# Patient Record
Sex: FEMALE | Race: WHITE | Employment: FULL TIME | URBAN - METROPOLITAN AREA
[De-identification: names, ages, dates, MRNs, and addresses within clinical notes are randomized per-mention and may not be internally consistent; named-entity substitution may affect disease eponyms.]

---

## 2017-03-06 ENCOUNTER — OFFICE VISIT (OUTPATIENT)
Dept: ORTHOPEDIC SURGERY | Age: 47
End: 2017-03-06

## 2017-03-06 VITALS — HEIGHT: 67 IN | WEIGHT: 150 LBS | BODY MASS INDEX: 23.54 KG/M2

## 2017-03-06 DIAGNOSIS — M25.561 ACUTE PAIN OF RIGHT KNEE: Primary | ICD-10-CM

## 2017-03-06 PROCEDURE — 99213 OFFICE O/P EST LOW 20 MIN: CPT | Performed by: ORTHOPAEDIC SURGERY

## 2017-03-06 PROCEDURE — 73562 X-RAY EXAM OF KNEE 3: CPT | Performed by: ORTHOPAEDIC SURGERY

## 2017-03-10 ENCOUNTER — OFFICE VISIT (OUTPATIENT)
Dept: ORTHOPEDIC SURGERY | Age: 47
End: 2017-03-10

## 2017-03-10 VITALS
DIASTOLIC BLOOD PRESSURE: 80 MMHG | BODY MASS INDEX: 23.53 KG/M2 | HEIGHT: 67 IN | HEART RATE: 77 BPM | SYSTOLIC BLOOD PRESSURE: 115 MMHG | WEIGHT: 149.91 LBS

## 2017-03-10 DIAGNOSIS — S83.281A ACUTE LATERAL MENISCAL TEAR, RIGHT, INITIAL ENCOUNTER: ICD-10-CM

## 2017-03-10 DIAGNOSIS — M23.006 MENISCAL CYST, RIGHT: ICD-10-CM

## 2017-03-10 DIAGNOSIS — M17.11 PRIMARY OSTEOARTHRITIS OF RIGHT KNEE: Primary | ICD-10-CM

## 2017-03-10 PROBLEM — M23.009 MENISCAL CYST: Status: ACTIVE | Noted: 2017-03-10

## 2017-03-10 PROBLEM — S83.289A ACUTE LATERAL MENISCAL TEAR: Status: ACTIVE | Noted: 2017-03-10

## 2017-03-10 PROCEDURE — 99213 OFFICE O/P EST LOW 20 MIN: CPT | Performed by: ORTHOPAEDIC SURGERY

## 2018-08-08 ENCOUNTER — HOSPITAL ENCOUNTER (OUTPATIENT)
Dept: WOMENS IMAGING | Age: 48
Discharge: HOME | End: 2018-08-08
Payer: COMMERCIAL

## 2018-08-08 DIAGNOSIS — Z12.31 ENCOUNTER FOR SCREENING MAMMOGRAM FOR BREAST CANCER: ICD-10-CM

## 2018-08-08 PROCEDURE — 77067 SCR MAMMO BI INCL CAD: CPT

## 2022-05-22 ENCOUNTER — HOSPITAL ENCOUNTER (EMERGENCY)
Age: 52
Discharge: HOME | End: 2022-05-22
Attending: STUDENT IN AN ORGANIZED HEALTH CARE EDUCATION/TRAINING PROGRAM
Payer: COMMERCIAL

## 2022-05-22 ENCOUNTER — APPOINTMENT (OUTPATIENT)
Dept: CT IMAGING | Age: 52
End: 2022-05-22
Payer: COMMERCIAL

## 2022-05-22 ENCOUNTER — APPOINTMENT (OUTPATIENT)
Dept: GENERAL RADIOLOGY | Age: 52
End: 2022-05-22
Payer: COMMERCIAL

## 2022-05-22 VITALS
BODY MASS INDEX: 25.9 KG/M2 | SYSTOLIC BLOOD PRESSURE: 119 MMHG | TEMPERATURE: 99.1 F | HEIGHT: 67 IN | OXYGEN SATURATION: 95 % | RESPIRATION RATE: 16 BRPM | WEIGHT: 165 LBS | DIASTOLIC BLOOD PRESSURE: 72 MMHG | HEART RATE: 86 BPM
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
## 2022-05-22 DIAGNOSIS — S01.01XA LACERATION OF SCALP, INITIAL ENCOUNTER: Primary | ICD-10-CM

## 2022-05-22 DIAGNOSIS — F10.920 ACUTE ALCOHOLIC INTOXICATION WITHOUT COMPLICATION (HCC): ICD-10-CM

## 2022-05-22 DIAGNOSIS — S62.306A CLOSED NONDISPLACED FRACTURE OF FIFTH METACARPAL BONE OF RIGHT HAND, UNSPECIFIED PORTION OF METACARPAL, INITIAL ENCOUNTER: ICD-10-CM

## 2022-05-22 PROCEDURE — 12002 RPR S/N/AX/GEN/TRNK2.6-7.5CM: CPT

## 2022-05-22 PROCEDURE — 6370000000 HC RX 637 (ALT 250 FOR IP): Performed by: STUDENT IN AN ORGANIZED HEALTH CARE EDUCATION/TRAINING PROGRAM

## 2022-05-22 PROCEDURE — 90471 IMMUNIZATION ADMIN: CPT | Performed by: STUDENT IN AN ORGANIZED HEALTH CARE EDUCATION/TRAINING PROGRAM

## 2022-05-22 PROCEDURE — 90715 TDAP VACCINE 7 YRS/> IM: CPT | Performed by: STUDENT IN AN ORGANIZED HEALTH CARE EDUCATION/TRAINING PROGRAM

## 2022-05-22 PROCEDURE — 6360000002 HC RX W HCPCS: Performed by: STUDENT IN AN ORGANIZED HEALTH CARE EDUCATION/TRAINING PROGRAM

## 2022-05-22 PROCEDURE — 70450 CT HEAD/BRAIN W/O DYE: CPT

## 2022-05-22 PROCEDURE — 99284 EMERGENCY DEPT VISIT MOD MDM: CPT

## 2022-05-22 PROCEDURE — 73130 X-RAY EXAM OF HAND: CPT

## 2022-05-22 PROCEDURE — 2500000003 HC RX 250 WO HCPCS: Performed by: STUDENT IN AN ORGANIZED HEALTH CARE EDUCATION/TRAINING PROGRAM

## 2022-05-22 RX ORDER — LIDOCAINE HYDROCHLORIDE AND EPINEPHRINE 10; 10 MG/ML; UG/ML
20 INJECTION, SOLUTION INFILTRATION; PERINEURAL ONCE
Status: COMPLETED | OUTPATIENT
Start: 2022-05-22 | End: 2022-05-22

## 2022-05-22 RX ORDER — ACETAMINOPHEN 500 MG
1000 TABLET ORAL ONCE
Status: COMPLETED | OUTPATIENT
Start: 2022-05-22 | End: 2022-05-22

## 2022-05-22 RX ADMIN — ACETAMINOPHEN 1000 MG: 500 TABLET ORAL at 01:33

## 2022-05-22 RX ADMIN — LIDOCAINE HYDROCHLORIDE,EPINEPHRINE BITARTRATE 20 ML: 10; .01 INJECTION, SOLUTION INFILTRATION; PERINEURAL at 02:07

## 2022-05-22 RX ADMIN — TETANUS TOXOID, REDUCED DIPHTHERIA TOXOID AND ACELLULAR PERTUSSIS VACCINE, ADSORBED 0.5 ML: 5; 2.5; 8; 8; 2.5 SUSPENSION INTRAMUSCULAR at 01:33

## 2022-05-22 ASSESSMENT — PAIN SCALES - GENERAL
PAINLEVEL_OUTOF10: 4
PAINLEVEL_OUTOF10: 4

## 2022-05-22 ASSESSMENT — PAIN - FUNCTIONAL ASSESSMENT: PAIN_FUNCTIONAL_ASSESSMENT: 0-10

## 2022-05-22 NOTE — ED NOTES
[facility] for d/c. Stable, ambulatory, w/ .       Kathy Rodriguez, VALENTE  05/22/22 1658

## 2022-05-22 NOTE — ED PROVIDER NOTES
[facility]  ED  EMERGENCY DEPARTMENT ENCOUNTER      Pt Name: Julita Rodriguez  MRN: [id number]  [place] 1970  Date of evaluation: 5/22/2022  Provider: Val Dumont MD    [address] [place] [address]       Chief Complaint   Patient presents with    Laceration     fell in kitchen hit metal handle, pt admits to etoh      Fall, laceration, R hand pain    HISTORY OF PRESENT ILLNESS   (Location/Symptom, Timing/Onset,Context/Setting, Quality, Duration, Modifying Factors, Severity)  Note limiting factors. Julita Rodriguez is a 46 y.o. female who presents to the ED with a chief complaint of laceration to the R frontotemporal scalp s/p fall that occurred immediately prior to arrival.  Pt slipped in the kitchen, fall described as mechanical, she had been drinking alcohol prior to the fall. Denies LOC, nausea, vomiting, focal weakness, neck pain. Mild headache. Also c/o R hand pain localized in the distribution of the 5th metacarpal.  Symptoms not otherwise alleviated or exacerbated by other factors. NursingNotes were reviewed. REVIEW OF SYSTEMS    (2-9 systems for level 4, 10 or more for level 5)     Review of Systems   Constitutional: Negative for chills and fever. HENT: Negative for congestion and sore throat. Eyes: Negative for pain and visual disturbance. Respiratory: Negative for cough and shortness of breath. Cardiovascular: Negative for chest pain and palpitations. Gastrointestinal: Negative for abdominal pain, diarrhea, nausea and vomiting. Genitourinary: Negative for dysuria and frequency. Musculoskeletal: Negative for back pain and neck pain. R hand pain   Skin: Positive for wound. Negative for rash. Neurological: Positive for headaches. Negative for dizziness, weakness and light-headedness. PAST MEDICAL HISTORY     Past Medical History:   Diagnosis Date    Primary osteoarthritis of right knee 3/10/2017         SURGICALHISTORY     Denies pertinent.       CURRENT MEDICATIONS       Discharge Medication List as of 5/22/2022  2:59 AM      CONTINUE these medications which have NOT CHANGED    Details   butalbital-acetaminophen-caffeine (FIORICET, ESGIC) per tablet Historical Med      prednisoLONE acetate (PRED FORTE) 1 % ophthalmic suspension Historical Med      naproxen sodium (ANAPROX DS) 550 MG tablet Take 1 tablet by mouth 2 times daily (with meals). , Disp-60 tablet, R-1             ALLERGIES     Mefloquine    FAMILY HISTORY     Denies pertinent. SOCIAL HISTORY       Social History     Socioeconomic History    Marital status:      Spouse name: Not on file    Number of children: Not on file    Years of education: Not on file    Highest education level: Not on file   Occupational History    Not on file   Tobacco Use    Smoking status: Never Smoker    Smokeless tobacco: Never Used   Substance and Sexual Activity    Alcohol use: Yes     Comment: social     Drug use: No    Sexual activity: Yes     Partners: Male   Other Topics Concern    Not on file   Social History Narrative    Not on file     Social Determinants of Health     Financial Resource Strain:     Difficulty of Paying Living Expenses: Not on file   Food Insecurity:     Worried About Running Out of Food in the Last Year: Not on file    Gary of Food in the Last Year: Not on file   Transportation Needs:     Lack of Transportation (Medical): Not on file    Lack of Transportation (Non-Medical):  Not on file   Physical Activity:     Days of Exercise per Week: Not on file    Minutes of Exercise per Session: Not on file   Stress:     Feeling of Stress : Not on file   Social Connections:     Frequency of Communication with Friends and Family: Not on file    Frequency of Social Gatherings with Friends and Family: Not on file    Attends Jehovah's witness Services: Not on file    Active Member of Clubs or Organizations: Not on file    Attends Club or Organization Meetings: Not on file    Marital Status: Not on file   Intimate Partner Violence:     Fear of Current or Ex-Partner: Not on file    Emotionally Abused: Not on file    Physically Abused: Not on file    Sexually Abused: Not on file   Housing Stability:     Unable to Pay for Housing in the Last Year: Not on file    Number of [place] in the Last Year: Not on file    Unstable Housing in the Last Year: Not on file       SCREENINGS    [place] Coma Scale  Eye Opening: Spontaneous  Best Verbal Response: Oriented  Best Motor Response: Obeys commands  [place] Coma Scale Score: 15        PHYSICAL EXAM    (up to 7 for level 4, 8 or more for level 5)     ED Triage Vitals   BP Temp Temp Source Pulse Resp SpO2 Height Weight   05/22/22 0050 05/22/22 0048 05/22/22 0048 05/22/22 0048 05/22/22 0048 05/22/22 0048 05/22/22 0048 05/22/22 0048   (!) 146/86 99.1 °F (37.3 °C) Oral 86 16 99 % 5' 7\" (1.702 m) 165 lb (74.8 kg)       General: Alert and oriented appropriately for age, No acute distress. Eye: Normal conjunctiva. Sclera anicteric. Physical Exam  HENT:      Head: Normocephalic. Laceration (R frontotemporal scalp, approx 5 cm) present. No raccoon eyes. Jaw: There is normal jaw occlusion. No trismus, tenderness, pain on movement or malocclusion. Mouth/Throat:      Mouth: No lacerations. Dentition: No dental tenderness. Pharynx: Oropharynx is clear. Neck:      Trachea: Trachea and phonation normal.   Musculoskeletal:      Right hand: Swelling and bony tenderness (R 5th distal metacarpal) present. No deformity. Normal range of motion. There is no disruption of two-point discrimination. Normal capillary refill. Normal pulse. Cervical back: Full passive range of motion without pain. No pain with movement, spinous process tenderness or muscular tenderness. Normal range of motion. Thoracic back: Normal.      Lumbar back: Normal.       Respiratory: Respirations even and non-labored.   Cardiovascular: Normal rate, Regular rhythm. Gastrointestinal: Soft, Non-tender, Non-distended. : deferred. Integumentary: Warm, Dry. Neurologic: Alert and appropriate for age. No focal deficits. Psychiatric: Cooperative. DIAGNOSTIC RESULTS         RADIOLOGY:   Non-plain filmimages such as CT, Ultrasound and MRI are read by the radiologist. Plain radiographic images are visualized and preliminarily interpreted by the emergency physician with the below findings:      Interpretation per the Radiologist below, if available at the time ofthis note:    CT HEAD WO CONTRAST   Final Result   No acute intracranial abnormality. XR HAND RIGHT (MIN 3 VIEWS)   Final Result   Acute nondisplaced fracture of the 5th metacarpal extending to the MCP joint. EMERGENCY DEPARTMENT COURSE and DIFFERENTIAL DIAGNOSIS/MDM:   Vitals:    Vitals:    22 0121 22 0152 22 0221 22 0250   BP: 137/85 130/74 (!) 88/56 119/72   Pulse:       Resp:       Temp:       TempSrc:       SpO2:  93%  95%   Weight:       Height:             Medical decision makin yo F p/w fall, head trauma, scalp laceration, denies LOC but +ve EtOH, is GCS 15, neuro intact, hand ttp in the distribution of the 5th metacarpal which is closed injury, NVI.  CTH, R hand XR, Tdap, lac irrigation and repair, APAP. Medications   acetaminophen (TYLENOL) tablet 1,000 mg (1,000 mg Oral Given 22 0133)   lidocaine-EPINEPHrine 1 %-1:[id number] injection 20 mL (20 mLs IntraDERmal Given 22 0207)   tetanus-diphth-acell pertussis (BOOSTRIX) injection 0.5 mL (0.5 mLs IntraMUSCular Given 22)     CTH neg for intracranial injury. XR showed 5th metacarpal fx, ulnar gutter splint placed. Laceration repaired as in my procedure note, pt tolerated well. Stable for and amenable to d/c home.  at bedside and both plan to [facility] home. She remains HDS, neuro intact.   Given d/c instructions and return precautions, laceration care instructions, instructions to follow up for staple removal, pt voices understanding. D/c home, ambulated steadily from the ED. I estimate there is LOW risk for (including but not limited to) INTRACRANIAL HEMORRHAGE, CENTRAL CORD SYNDROME, UNSTABLE SPINE FRACTURE, AORTIC DISSECTION, PERFORATED VISCUS, SOLID ORGAN LACERATION, CAUDA EQUINA, UNSTABLE PELVIC FRACTURE, COMPARTMENT SYNDROME, ACUTE ARTERIAL INJURY, or OPEN FRACTURE thus I consider the discharge disposition reasonable. [place] [facility] (or their surrogate) and I have discussed the diagnosis and risks, and we agree with discharging home with close follow-up. We also discussed returning to the Emergency Department immediately if new or worsening symptoms occur. We have discussed the symptoms which are most concerning that necessitate immediate return. PROCEDURES:  Lac Repair    Date/Time: 5/24/2022 9:21 AM  Performed by: Rachele Bae MD  Authorized by: Rachele Bae MD     Consent:     Consent obtained:  Verbal    Consent given by:  Patient    Risks discussed:  Infection, pain, poor cosmetic result and poor wound healing    Alternatives discussed:  No treatment  Anesthesia (see MAR for exact dosages):      Anesthesia method:  Local infiltration    Local anesthetic:  Lidocaine 1% WITH epi  Laceration details:     Location:  Scalp    Scalp location:  R temporal    Length (cm):  5    Depth (mm):  2  Repair type:     Repair type:  Simple  Pre-procedure details:     Preparation:  Patient was prepped and draped in usual sterile fashion and imaging obtained to evaluate for foreign bodies  Exploration:     Hemostasis achieved with:  Direct pressure    Wound exploration: entire depth of wound probed and visualized      Wound extent: no foreign bodies/material noted, no muscle damage noted and no underlying fracture noted      Contaminated: yes    Treatment:     Area cleansed with:  Saline    Amount of cleaning:  Standard    Irrigation solution:  Sterile saline    Irrigation method: Syringe  Skin repair:     Repair method:  Staples    Number of staples:  6  Approximation:     Approximation:  Close  Post-procedure details:     Dressing:  Open (no dressing)    Patient tolerance of procedure: Tolerated well, no immediate complications          FINAL IMPRESSION      1. Laceration of scalp, initial encounter    2. Fall, initial encounter    3. Acute alcoholic intoxication without complication (HCC)    4. Closed nondisplaced fracture of fifth metacarpal bone of right hand, unspecified portion of metacarpal, initial encounter          DISPOSITION/PLAN   DISPOSITION Decision To Discharge 05/22/2022 02:59:28 AM      PATIENT REFERRED TO:  Mookie Rice  [facility] [address]  [phone]      for staple removal, For wound re-check    [facility]  ED  [facility] [address] [phone]    If symptoms worsen, For wound re-check, for staple removal if unable to get into your primary care physician.   Also return if signs or symptoms of infection develop as we discussed    Kimberli Perea MD  [address] [place] [address]    In 1 week        DISCHARGE MEDICATIONS:  Discharge Medication List as of 5/22/2022  2:59 AM             (Please note that portions of this note were completed with a voice recognition program.Efforts were made to edit the dictations but occasionally words are mis-transcribed.)    Carola Harding MD (electronically signed)  Attending Emergency Physician          Carola Harding MD  05/24/22 5601

## 2022-05-23 ENCOUNTER — TELEPHONE (OUTPATIENT)
Dept: ORTHOPEDIC SURGERY | Age: 52
End: 2022-05-23

## 2022-05-23 NOTE — TELEPHONE ENCOUNTER
Spoke to Janie. She has already called to schedule with [facility] and is awaiting their return call with an appointment. Let Janie know she can call back to schedule with Dr Iram Novak if [facility] does not work out.

## 2022-05-24 ASSESSMENT — ENCOUNTER SYMPTOMS
BACK PAIN: 0
SHORTNESS OF BREATH: 0
NAUSEA: 0
VOMITING: 0
EYE PAIN: 0
COUGH: 0
DIARRHEA: 0
SORE THROAT: 0
ABDOMINAL PAIN: 0